# Patient Record
Sex: MALE | Race: OTHER | HISPANIC OR LATINO | URBAN - METROPOLITAN AREA
[De-identification: names, ages, dates, MRNs, and addresses within clinical notes are randomized per-mention and may not be internally consistent; named-entity substitution may affect disease eponyms.]

---

## 2021-01-19 ENCOUNTER — EMERGENCY (EMERGENCY)
Facility: HOSPITAL | Age: 46
LOS: 1 days | Discharge: HOME | End: 2021-01-19
Admitting: EMERGENCY MEDICINE
Payer: COMMERCIAL

## 2021-01-19 VITALS
SYSTOLIC BLOOD PRESSURE: 148 MMHG | OXYGEN SATURATION: 97 % | TEMPERATURE: 98 F | DIASTOLIC BLOOD PRESSURE: 80 MMHG | HEART RATE: 78 BPM | RESPIRATION RATE: 16 BRPM

## 2021-01-19 DIAGNOSIS — Z20.822 CONTACT WITH AND (SUSPECTED) EXPOSURE TO COVID-19: ICD-10-CM

## 2021-01-19 PROCEDURE — 99283 EMERGENCY DEPT VISIT LOW MDM: CPT

## 2021-01-19 NOTE — ED PROVIDER NOTE - PATIENT PORTAL LINK FT
You can access the FollowMyHealth Patient Portal offered by [facility] by registering at the following website: [email or web address]. By joining [email or web address]’s FollowMyHealth portal, you will also be able to view your health information using other applications (apps) compatible with our system.

## 2021-01-19 NOTE — ED PROVIDER NOTE - OBJECTIVE STATEMENT
45 yo M with no known PMHx, presenting for COVID screening.  Reports no active sx or contact with COVID+ personnels.  Denies fever, chills, cough, SOB, palpitations, HA, dizziness, congestion, rhinorrhea, N/V/D/C, abdomina pain, change in urinary/bowel function, focal weakness, and malaise. No recent travel noted

## 2021-01-19 NOTE — ED PROVIDER NOTE - CONDITION AT DISCHARGE:
Likely post-op blood loss.  Monitor H/H.  No clinical indication for PRBC transfusion today. Satisfactory

## 2021-01-19 NOTE — ED PROVIDER NOTE - NS ED ROS FT
GEN - no fever, chills, malaise, weight loss   Skin - no rash, lesions, itch  HEENT - no rhinorrhea, congestion, change in vision/hearing/smell, sore throat, change in voice, neck pain  CV - no chest pain, palpitations, syncope  Resp - no cough, SOB, RUBIO, wheezing, hemoptysis  GI - no N/V/D/C  MSK - no swelling, joint pain, swelling, myalgia  Hem - No easy bruising or bleeding  Neuro - no HA, dizziness, loss of smell/taste, focal weakness, paresthesia  Psych - no insomonia or mood changes

## 2021-01-19 NOTE — ED PROVIDER NOTE - PHYSICAL EXAMINATION
Gen - WDWN, NAD, speaking in full sentences  Skin - no acute rash, intact   HEENT - AT/NC, no conjunctival injection or dc, neck supple and FROM, airway patent   CV - S1S2, R/R/R  Resp - CTAB, no focal r/r/w   MSK - w/w/p, full ROM of peripheral joints, no midline tenderness   Psych - euphoria, normal speech and eye contact, judgement/insight intact   Neuro - AxOx3, ambulatory with steady gait
